# Patient Record
Sex: FEMALE | Race: WHITE
[De-identification: names, ages, dates, MRNs, and addresses within clinical notes are randomized per-mention and may not be internally consistent; named-entity substitution may affect disease eponyms.]

---

## 2020-03-19 ENCOUNTER — HOSPITAL ENCOUNTER (EMERGENCY)
Dept: HOSPITAL 84 - D.ER | Age: 40
Discharge: HOME | End: 2020-03-19
Payer: COMMERCIAL

## 2020-03-19 VITALS — DIASTOLIC BLOOD PRESSURE: 65 MMHG | SYSTOLIC BLOOD PRESSURE: 144 MMHG

## 2020-03-19 VITALS — HEIGHT: 66 IN | BODY MASS INDEX: 33.66 KG/M2 | WEIGHT: 209.44 LBS

## 2020-03-19 DIAGNOSIS — R10.9: ICD-10-CM

## 2020-03-19 DIAGNOSIS — Z3A.18: ICD-10-CM

## 2020-03-19 DIAGNOSIS — O26.892: Primary | ICD-10-CM

## 2020-03-19 DIAGNOSIS — I10: ICD-10-CM

## 2020-03-19 DIAGNOSIS — K59.00: ICD-10-CM

## 2020-03-19 LAB
ALBUMIN SERPL-MCNC: 3 G/DL (ref 3.4–5)
ALP SERPL-CCNC: 94 U/L (ref 30–120)
ALT SERPL-CCNC: 14 U/L (ref 10–68)
AMYLASE SERPL-CCNC: 295 U/L (ref 25–115)
ANION GAP SERPL CALC-SCNC: 9.3 MMOL/L (ref 8–16)
BASOPHILS NFR BLD AUTO: 0.1 % (ref 0–2)
BILIRUB SERPL-MCNC: 0.18 MG/DL (ref 0.2–1.3)
BILIRUB SERPL-MCNC: NEGATIVE MG/DL
BUN SERPL-MCNC: 5 MG/DL (ref 7–18)
CALCIUM SERPL-MCNC: 8.3 MG/DL (ref 8.5–10.1)
CHLORIDE SERPL-SCNC: 99 MMOL/L (ref 98–107)
CO2 SERPL-SCNC: 27.2 MMOL/L (ref 21–32)
CREAT SERPL-MCNC: 0.6 MG/DL (ref 0.6–1.3)
EOSINOPHIL NFR BLD: 0.9 % (ref 0–7)
ERYTHROCYTE [DISTWIDTH] IN BLOOD BY AUTOMATED COUNT: 13.8 % (ref 11.5–14.5)
GLOBULIN SER-MCNC: 4.2 G/L
GLUCOSE SERPL-MCNC: 105 MG/DL (ref 74–106)
GLUCOSE SERPL-MCNC: NEGATIVE MG/DL
HCG SERPL-ACNC: (no result) MIU/ML
HCG SERPL-ACNC: POSITIVE M[IU]/ML
HCT VFR BLD CALC: 40.9 % (ref 36–48)
HGB BLD-MCNC: 14.2 G/DL (ref 12–16)
IMM GRANULOCYTES NFR BLD: 0.3 % (ref 0–5)
KETONES UR STRIP-MCNC: (no result) MG/DL
LIPASE SERPL-CCNC: 1077 U/L (ref 73–393)
LYMPHOCYTES NFR BLD AUTO: 13.9 % (ref 15–50)
MCH RBC QN AUTO: 29.4 PG (ref 26–34)
MCHC RBC AUTO-ENTMCNC: 34.7 G/DL (ref 31–37)
MCV RBC: 84.7 FL (ref 80–100)
MONOCYTES NFR BLD: 4.1 % (ref 2–11)
NEUTROPHILS NFR BLD AUTO: 80.7 % (ref 40–80)
NITRITE UR-MCNC: NEGATIVE MG/ML
OSMOLALITY SERPL CALC.SUM OF ELEC: 261 MOSM/KG (ref 275–300)
PH UR STRIP: 5 [PH] (ref 5–6)
PLATELET # BLD: 226 10X3/UL (ref 130–400)
PMV BLD AUTO: 10.1 FL (ref 7.4–10.4)
POTASSIUM SERPL-SCNC: 3.5 MMOL/L (ref 3.5–5.1)
PROT SERPL-MCNC: 7.2 G/DL (ref 6.4–8.2)
RBC # BLD AUTO: 4.83 10X6/UL (ref 4–5.4)
SODIUM SERPL-SCNC: 132 MMOL/L (ref 136–145)
SP GR UR STRIP: 1.01 (ref 1–1.02)
UROBILINOGEN UR-MCNC: NORMAL MG/DL
WBC # BLD AUTO: 18.7 10X3/UL (ref 4.8–10.8)

## 2020-04-02 ENCOUNTER — HOSPITAL ENCOUNTER (OUTPATIENT)
Dept: HOSPITAL 84 - D.LDO | Age: 40
End: 2020-04-02
Attending: OBSTETRICS & GYNECOLOGY
Payer: COMMERCIAL

## 2020-04-02 VITALS — BODY MASS INDEX: 33.8 KG/M2

## 2020-04-02 DIAGNOSIS — O26.899: Primary | ICD-10-CM

## 2020-04-02 DIAGNOSIS — R10.9: ICD-10-CM

## 2020-04-02 DIAGNOSIS — R11.2: ICD-10-CM

## 2020-04-02 DIAGNOSIS — Z3A.00: ICD-10-CM

## 2020-04-02 LAB
ALBUMIN SERPL-MCNC: 3 G/DL (ref 3.4–5)
ALP SERPL-CCNC: 119 U/L (ref 30–120)
ALT SERPL-CCNC: 16 U/L (ref 10–68)
AMYLASE SERPL-CCNC: 429 U/L (ref 25–115)
ANION GAP SERPL CALC-SCNC: 13.2 MMOL/L (ref 8–16)
BACTERIA #/AREA URNS HPF: (no result) /HPF
BILIRUB SERPL-MCNC: 0.43 MG/DL (ref 0.2–1.3)
BILIRUB SERPL-MCNC: NEGATIVE MG/DL
BUN SERPL-MCNC: 4 MG/DL (ref 7–18)
CALCIUM SERPL-MCNC: 8 MG/DL (ref 8.5–10.1)
CHLORIDE SERPL-SCNC: 99 MMOL/L (ref 98–107)
CO2 SERPL-SCNC: 25.3 MMOL/L (ref 21–32)
CREAT SERPL-MCNC: 0.7 MG/DL (ref 0.6–1.3)
EOSINOPHIL NFR BLD: 5 % (ref 0–7)
ERYTHROCYTE [DISTWIDTH] IN BLOOD BY AUTOMATED COUNT: 13.6 % (ref 11.5–14.5)
GLOBULIN SER-MCNC: 3.6 G/L
GLUCOSE SERPL-MCNC: 100 MG/DL
GLUCOSE SERPL-MCNC: 104 MG/DL (ref 74–106)
HCT VFR BLD CALC: 40.7 % (ref 36–48)
HGB BLD-MCNC: 13.8 G/DL (ref 12–16)
KETONES UR STRIP-MCNC: NEGATIVE MG/DL
LIPASE SERPL-CCNC: 1208 U/L (ref 73–393)
LYMPHOCYTES NFR BLD AUTO: 10 % (ref 15–50)
MCH RBC QN AUTO: 29.1 PG (ref 26–34)
MCHC RBC AUTO-ENTMCNC: 33.9 G/DL (ref 31–37)
MCV RBC: 85.9 FL (ref 80–100)
MONOCYTES NFR BLD: 4 % (ref 2–11)
NEUTROPHILS NFR BLD AUTO: 78 % (ref 40–80)
NITRITE UR-MCNC: NEGATIVE MG/ML
OSMOLALITY SERPL CALC.SUM OF ELEC: 264 MOSM/KG (ref 275–300)
PH UR STRIP: 5 [PH] (ref 5–6)
PLATELET # BLD EST: NORMAL 10*3/UL
PLATELET # BLD: 247 10X3/UL (ref 130–400)
PMV BLD AUTO: 10.6 FL (ref 7.4–10.4)
POTASSIUM SERPL-SCNC: 3.5 MMOL/L (ref 3.5–5.1)
PROT SERPL-MCNC: 6.6 G/DL (ref 6.4–8.2)
RBC # BLD AUTO: 4.74 10X6/UL (ref 4–5.4)
RBC #/AREA URNS HPF: (no result) /HPF (ref 0–5)
SODIUM SERPL-SCNC: 134 MMOL/L (ref 136–145)
SP GR UR STRIP: 1.02 (ref 1–1.02)
SQUAMOUS #/AREA URNS HPF: (no result) /HPF (ref 0–5)
UROBILINOGEN UR-MCNC: NORMAL MG/DL
WBC # BLD AUTO: 21.3 10X3/UL (ref 4.8–10.8)
WBC #/AREA URNS HPF: (no result) /HPF

## 2020-05-21 ENCOUNTER — HOSPITAL ENCOUNTER (OUTPATIENT)
Dept: HOSPITAL 84 - D.LDO | Age: 40
Discharge: HOME | End: 2020-05-21
Attending: OBSTETRICS & GYNECOLOGY
Payer: COMMERCIAL

## 2020-05-21 VITALS — BODY MASS INDEX: 33.8 KG/M2

## 2020-05-21 DIAGNOSIS — Z3A.27: ICD-10-CM

## 2020-05-21 DIAGNOSIS — O16.2: Primary | ICD-10-CM

## 2020-05-21 LAB
ALBUMIN SERPL-MCNC: 2.6 G/DL (ref 3.4–5)
ALP SERPL-CCNC: 114 U/L (ref 30–120)
ALT SERPL-CCNC: 12 U/L (ref 10–68)
ANION GAP SERPL CALC-SCNC: 11.3 MMOL/L (ref 8–16)
BASOPHILS NFR BLD AUTO: 0.2 % (ref 0–2)
BILIRUB DIRECT SERPL-MCNC: 0.1 MG/DL (ref 0–0.3)
BILIRUB INDIRECT SERPL-MCNC: 0.1 MG/DL (ref 0–1)
BILIRUB SERPL-MCNC: 0.2 MG/DL (ref 0.2–1.3)
BILIRUB SERPL-MCNC: NEGATIVE MG/DL
BUN SERPL-MCNC: 5 MG/DL (ref 7–18)
CALCIUM SERPL-MCNC: 7.9 MG/DL (ref 8.5–10.1)
CHLORIDE SERPL-SCNC: 101 MMOL/L (ref 98–107)
CO2 SERPL-SCNC: 21.8 MMOL/L (ref 21–32)
CREAT SERPL-MCNC: 0.8 MG/DL (ref 0.6–1.3)
EOSINOPHIL NFR BLD: 1.9 % (ref 0–7)
ERYTHROCYTE [DISTWIDTH] IN BLOOD BY AUTOMATED COUNT: 13.8 % (ref 11.5–14.5)
GLOBULIN SER-MCNC: 3.7 G/L
GLUCOSE SERPL-MCNC: 166 MG/DL (ref 74–106)
GLUCOSE SERPL-MCNC: NEGATIVE MG/DL
HCT VFR BLD CALC: 38.1 % (ref 36–48)
HGB BLD-MCNC: 12.8 G/DL (ref 12–16)
IMM GRANULOCYTES NFR BLD: 0.6 % (ref 0–5)
KETONES UR STRIP-MCNC: NEGATIVE MG/DL
LYMPHOCYTES NFR BLD AUTO: 11.2 % (ref 15–50)
MCH RBC QN AUTO: 29.4 PG (ref 26–34)
MCHC RBC AUTO-ENTMCNC: 33.6 G/DL (ref 31–37)
MCV RBC: 87.4 FL (ref 80–100)
MONOCYTES NFR BLD: 3.6 % (ref 2–11)
NEUTROPHILS NFR BLD AUTO: 82.5 % (ref 40–80)
NITRITE UR-MCNC: NEGATIVE MG/ML
OSMOLALITY SERPL CALC.SUM OF ELEC: 263 MOSM/KG (ref 275–300)
PH UR STRIP: 8 [PH] (ref 5–6)
PLATELET # BLD: 213 10X3/UL (ref 130–400)
PMV BLD AUTO: 10.2 FL (ref 7.4–10.4)
POTASSIUM SERPL-SCNC: 3.1 MMOL/L (ref 3.5–5.1)
PROT SERPL-MCNC: 6.3 G/DL (ref 6.4–8.2)
RBC # BLD AUTO: 4.36 10X6/UL (ref 4–5.4)
SODIUM SERPL-SCNC: 131 MMOL/L (ref 136–145)
SP GR UR STRIP: 1 (ref 1–1.02)
URATE SERPL-MCNC: 3.3 MG/DL (ref 2.6–7.2)
UROBILINOGEN UR-MCNC: NORMAL MG/DL
WBC # BLD AUTO: 19.2 10X3/UL (ref 4.8–10.8)

## 2020-05-22 LAB
PROT 24H UR-MRATE: 195 MG/24HR (ref 0–149.1)
PROT UR-MCNC: 9.5 MG/DL (ref 0–11.9)

## 2020-07-23 ENCOUNTER — HOSPITAL ENCOUNTER (INPATIENT)
Dept: HOSPITAL 84 - D.LDO | Age: 40
LOS: 1 days | Discharge: HOME | End: 2020-07-24
Attending: OBSTETRICS & GYNECOLOGY | Admitting: OBSTETRICS & GYNECOLOGY
Payer: COMMERCIAL

## 2020-07-23 VITALS — HEIGHT: 66 IN | WEIGHT: 209.44 LBS | BODY MASS INDEX: 33.66 KG/M2

## 2020-07-23 VITALS — DIASTOLIC BLOOD PRESSURE: 59 MMHG | SYSTOLIC BLOOD PRESSURE: 111 MMHG

## 2020-07-23 DIAGNOSIS — O36.4XX0: Primary | ICD-10-CM

## 2020-07-23 DIAGNOSIS — Z3A.36: ICD-10-CM

## 2020-07-23 DIAGNOSIS — Z37.1: ICD-10-CM

## 2020-07-23 DIAGNOSIS — O45.93: ICD-10-CM

## 2020-07-23 LAB
ALBUMIN SERPL-MCNC: 2.4 G/DL (ref 3.4–5)
ALP SERPL-CCNC: 175 U/L (ref 30–120)
ALT SERPL-CCNC: 12 U/L (ref 10–68)
AMPHETAMINES UR QL SCN: NEGATIVE QUAL
ANION GAP SERPL CALC-SCNC: 14 MMOL/L (ref 8–16)
BARBITURATES UR QL SCN: NEGATIVE QUAL
BENZODIAZ UR QL SCN: NEGATIVE QUAL
BILIRUB SERPL-MCNC: 0.28 MG/DL (ref 0.2–1.3)
BUN SERPL-MCNC: 8 MG/DL (ref 7–18)
BZE UR QL SCN: NEGATIVE QUAL
CALCIUM SERPL-MCNC: 8.6 MG/DL (ref 8.5–10.1)
CANNABINOIDS UR QL SCN: NEGATIVE QUAL
CHLORIDE SERPL-SCNC: 101 MMOL/L (ref 98–107)
CO2 SERPL-SCNC: 20.7 MMOL/L (ref 21–32)
CREAT SERPL-MCNC: 1.1 MG/DL (ref 0.6–1.3)
EOSINOPHIL NFR BLD: 3 % (ref 0–7)
ERYTHROCYTE [DISTWIDTH] IN BLOOD BY AUTOMATED COUNT: 13.9 % (ref 11.5–14.5)
GLOBULIN SER-MCNC: 4.1 G/L
GLUCOSE SERPL-MCNC: 155 MG/DL (ref 74–106)
HCT VFR BLD CALC: 38.6 % (ref 36–48)
HGB BLD-MCNC: 13.3 G/DL (ref 12–16)
LYMPHOCYTES NFR BLD AUTO: 14 % (ref 15–50)
MCH RBC QN AUTO: 29.8 PG (ref 26–34)
MCHC RBC AUTO-ENTMCNC: 34.5 G/DL (ref 31–37)
MCV RBC: 86.5 FL (ref 80–100)
MONOCYTES NFR BLD: 6 % (ref 2–11)
NEUTROPHILS NFR BLD AUTO: 71 % (ref 40–80)
OPIATES UR QL SCN: NEGATIVE QUAL
OSMOLALITY SERPL CALC.SUM OF ELEC: 265 MOSM/KG (ref 275–300)
PCP UR QL SCN: NEGATIVE QUAL
PLATELET # BLD EST: NORMAL 10*3/UL
PLATELET # BLD: 161 10X3/UL (ref 130–400)
PMV BLD AUTO: 10.4 FL (ref 7.4–10.4)
POTASSIUM SERPL-SCNC: 3.7 MMOL/L (ref 3.5–5.1)
PROT SERPL-MCNC: 6.5 G/DL (ref 6.4–8.2)
RBC # BLD AUTO: 4.46 10X6/UL (ref 4–5.4)
SODIUM SERPL-SCNC: 132 MMOL/L (ref 136–145)
WBC # BLD AUTO: 27.3 10X3/UL (ref 4.8–10.8)

## 2020-07-23 PROCEDURE — 3E033VJ INTRODUCTION OF OTHER HORMONE INTO PERIPHERAL VEIN, PERCUTANEOUS APPROACH: ICD-10-PCS | Performed by: OBSTETRICS & GYNECOLOGY

## 2020-07-23 NOTE — NUR
NOTIFIED OF PT REQUEST FOR PAIN MED AND D/W MD CTX PATTERN EVERY 1
MIN. NEW ORDERS NOTED TO DC CYTOTEC

## 2020-07-24 VITALS — SYSTOLIC BLOOD PRESSURE: 121 MMHG | DIASTOLIC BLOOD PRESSURE: 64 MMHG

## 2020-07-24 VITALS — DIASTOLIC BLOOD PRESSURE: 66 MMHG | SYSTOLIC BLOOD PRESSURE: 135 MMHG

## 2020-07-24 LAB
ALBUMIN SERPL-MCNC: 2.1 G/DL (ref 3.4–5)
ALP SERPL-CCNC: 141 U/L (ref 30–120)
ALT SERPL-CCNC: 10 U/L (ref 10–68)
AMYLASE SERPL-CCNC: 94 U/L (ref 25–115)
ANION GAP SERPL CALC-SCNC: 13.1 MMOL/L (ref 8–16)
APTT BLD: 32 SECONDS (ref 22.8–39.4)
BACTERIA #/AREA URNS HPF: (no result) /HPF
BASOPHILS NFR BLD AUTO: 0.1 % (ref 0–2)
BILIRUB SERPL-MCNC: 0.2 MG/DL (ref 0.2–1.3)
BILIRUB SERPL-MCNC: NEGATIVE MG/DL
BUN SERPL-MCNC: 10 MG/DL (ref 7–18)
CALCIUM SERPL-MCNC: 8.2 MG/DL (ref 8.5–10.1)
CAOX CRY #/AREA URNS HPF: (no result) /HPF
CHLORIDE SERPL-SCNC: 101 MMOL/L (ref 98–107)
CO2 SERPL-SCNC: 24.6 MMOL/L (ref 21–32)
CREAT SERPL-MCNC: 1.2 MG/DL (ref 0.6–1.3)
D DIMER PPP FEU-MCNC: > 20 UG/MLFEU (ref 0.2–0.54)
EOSINOPHIL NFR BLD: 0.1 % (ref 0–7)
ERYTHROCYTE [DISTWIDTH] IN BLOOD BY AUTOMATED COUNT: 14 % (ref 11.5–14.5)
FIBRINOGEN PPP-MCNC: 153 MG/DL (ref 239–481)
GLOBULIN SER-MCNC: 3.3 G/L
GLUCOSE SERPL-MCNC: 100 MG/DL
GLUCOSE SERPL-MCNC: 140 MG/DL (ref 74–106)
HCT VFR BLD CALC: 29.6 % (ref 36–48)
HGB BLD-MCNC: 10 G/DL (ref 12–16)
IMM GRANULOCYTES NFR BLD: 2.3 % (ref 0–5)
INR PPP: 1.24 (ref 0.85–1.17)
KETONES UR STRIP-MCNC: NEGATIVE MG/DL
LIPASE SERPL-CCNC: 164 U/L (ref 73–393)
LYMPHOCYTES NFR BLD AUTO: 5.8 % (ref 15–50)
MCH RBC QN AUTO: 29.2 PG (ref 26–34)
MCHC RBC AUTO-ENTMCNC: 33.8 G/DL (ref 31–37)
MCV RBC: 86.5 FL (ref 80–100)
MONOCYTES NFR BLD: 3.5 % (ref 2–11)
NEUTROPHILS NFR BLD AUTO: 88.2 % (ref 40–80)
NITRITE UR-MCNC: NEGATIVE MG/ML
OSMOLALITY SERPL CALC.SUM OF ELEC: 268 MOSM/KG (ref 275–300)
PH UR STRIP: 5 [PH] (ref 5–6)
PLATELET # BLD: 117 10X3/UL (ref 130–400)
PMV BLD AUTO: 10.4 FL (ref 7.4–10.4)
POTASSIUM SERPL-SCNC: 4.7 MMOL/L (ref 3.5–5.1)
PROT SERPL-MCNC: 5.4 G/DL (ref 6.4–8.2)
PROTHROMBIN TIME: 15.5 SECONDS (ref 11.6–15)
RBC # BLD AUTO: 3.42 10X6/UL (ref 4–5.4)
RBC #/AREA URNS HPF: (no result) /HPF (ref 0–5)
SODIUM SERPL-SCNC: 134 MMOL/L (ref 136–145)
SP GR UR STRIP: 1.03 (ref 1–1.02)
SQUAMOUS #/AREA URNS HPF: (no result) /HPF (ref 0–5)
UROBILINOGEN UR-MCNC: NORMAL MG/DL
WBC # BLD AUTO: 36.7 10X3/UL (ref 4.8–10.8)
WBC #/AREA URNS HPF: (no result) /HPF

## 2020-07-24 NOTE — NUR
WRITTEN AND VERBAL DISCHARGE INSTRUCTIONS GIVEN. VERBALIZES UNDERSTANDING. PT
PROVIDED WITH RESOLVE THROUGH SHARING PACKET, PFW PP CARE INSTRUCTIONS
HANDOUT, SAVE YOUR LIFE HANDOUT, AND COMMUNITY [employer] HANDOUT PROVIDED. KEEP
SAKE BOX ALSO PROVIDED WITH HAND AND FOOT PRINTS. DENIES QUESTIONS. PT
REQUESTS TO EAT DINNER TRAY AND THEN LEAVE. STATES THAT SHE WILL CALL WITH
CALL LIGHT WHEN READY TO LEAVE.

## 2020-07-24 NOTE — NUR
DR. RICKETTS CALLS UNIT, UPDATE ON PT STATUS GIVEN. PER DR. RICKETTS HE WILL
ROUND ON PT FOLLOWING COMPLETION OF CLINIC.

## 2020-07-24 NOTE — NUR
BACK TO ROOM. PT CALLS RN TO ROOM, REQUESTS THAT [facility] BE
CONTACTED FOR REMOVAL OF INFANT. DENIES PAIN AND NEEDS AT THIS TIME. REFUSES
V/S AT THIS TIME, STATES "I FEEL FINE AND NEED TO MAKE A FEW PHONE CALLS."
PT'S MOTHER REMAINS WITH HER, SUPPORTIVE AND ATTENTIVE TO PT AND PT NEEDS.

## 2020-07-24 NOTE — NUR
SITTING IN HIGH FOWLERS POSITION. BECOMES TEARFUL. QUESTIONS REGARDING ORGAN
DONATION ANSWERED AND REINFORCED WITH PT THAT KEVIN  WOULD HAVE TO
BE CONTACTED TO ANSWER MOST QUESTIONS REGARDING LENGTH OF TIME THAT TISSUE IS
HARVESTABLE, VERBALIZES UNDERSTANDING. CHAPLE SERVICES OFFERED AND DECLINED AT
THIS TIME. STATES THAT SHE HAS ACCEPTED INFANT LOSS BUT IS CONCERNED FOR
SIGNIFICANT OTHER. PT VERBALIZES THAT SHE FEELS HIS PARENTS MAY HAVE TO COME
TO HELP SUPPORT HIM AND TO "GET HIM TO REALITY OF WHAT HAS HAPPENED." STATES
THAT SHE KNOWS VISITOR RESTRICTIONS ARE IN PLACE, CONTACTED CASSY, HOUSE
SUPERVISOR AND DISCUSSED SITUATION. STATES THAT EXPECTION CAN BE MADE AND SHE
WOULD NOTIFY BOTH ENTRANCES. PT NOTIFIED AND VERBALIZES APPRECIATION.

## 2020-07-24 NOTE — NUR
REQUESTS TO SHOWER FOLLOWING EATING LUNCH. STATES THAT HER MOTHER WILL REMAIN
IN ROOM AND AT BEDSIDE WITH HER. REQUESTS TO TAKE NORCO FOLLOWING SHOWER.
DENIES NEEDS AT THIS TIME. WILL CONTINUE TO MONITOR. PT ALSO REQUESTING TO D/C
HOME ASAP, WILL DISCUSS WITH DR. RICKETTS WHEN HE ROUNDS.

## 2020-07-24 NOTE — NUR
SPOKE WITH [facility] REP. NOTIFIED OF NEED FOR
REMOVAL OF INFANT. WILL SEND STAFF FOR REMOVAL.

## 2020-07-24 NOTE — NUR
HERBERTH  CALLS UNIT IN REGARDS TO INFANT TO DISCUSS IF INFANT HAD BEEN
PLACED IN COOLER. ERICK,  INFORMED THAT FOB HAD NOT GIVEN
PERMISSION FOR INFANT TO BE TAKE FROM ROOM. PER ERICK, INFANT IS BEING
RELEASED AND IS UNABLE TO BE USED FOR DONATION. WILL NOTIFY PT.

## 2020-07-24 NOTE — NUR
UP TO VOID. DENIES DIZZINESS AND LIGHTHEADEDNESS. VOIDED 700 MLS WITH CLOT THE
SIZE OF HARD BOILED EGG PASSED. BACK TO BED, CONTINUES TO DENY
DIZZINESS/LIGHTHEADEDNESS. FUNDUS FIRM, MIDLINE AND U1 WITH SMALL RUBRA
LOCHIA, NO CLOTS NOTED TO PERIPADS. SIGNFICANT OTHER NOT IN ROOM AT THIS TIME,
STATES THAT SHE ASK HIM TO LEAVE TO CARE FOR OLDER CHILD AT HOME. QUESTIONS
REGARDING VISITOR AND VISITATION ANSWERED, PT VERBALIZES UNDERSTANDING STATES
THAT HER MOTHER IS GOING TO COME HELP HER MAKE DECISIONS. PT ALSO INFORMED
THAT [facility] HAD RELEASED INFANT AND INFANT WAS NO LONGER A CANDIDATE FOR
ORGAN/TISSUE DONATION D/T NOT BEING COOLED, VERBALIZES UNDERSTANDING AND
DENIES WANTING TO SPEAK WITH [facility] . INFANT REMAINS IN ROOM WITH
PT D/T PT REQUEST FOR BONDING TIME, STATES "I'VE ONLY GOTTEN TO HOLD HIM FOR A
FEW MINUTES BECAUSE I WAS TRYING TO LET ROSIE HAVE HIS TIME WITH HIM." LIGHTS
OFF PER PT REQUEST. DENIES NEEDS. BED IN LOW POSITION WITH SRUP X2. CALL LIGHT
AND PHONE WITHIN REACH. WILL CONTINUE TO MONITOR.

## 2020-07-24 NOTE — NUR
AMBULATORY IN [place]. PT REQUESTED RN CALL [facility] AND [place] 
TO FIND OUT COST OF CREMATION AND BURIAL AND IF CREMENTATORY IS PRESENT ON
SITE. BOTH  HOMES CONTACTED IN PT ROOM AND QUESTIONS AND ANSWERS
REVIEWED WITH PT. VERBALIZES UNDERSTANDING. REQUESTS TO DISCUSS WITH MOTHER
AND NOTIFY RN OF DECISION AT LATER TIME.

## 2020-07-24 NOTE — NUR
PT'S MOTHER ON UNIT AND TAKEN TO ROOM. PT JUST PICKED INFANT UP FROM OPEN
CRIB. REQUEST TIME TO BOND WITH INFANT WITH HER MOTHER IN ROOM PRIOR TO RN
TAKING V/S. DENIES PAIN AND NEEDS. BED IN LOW POSITION WITH SRUP X2. CALL
LIGHT AND PHONE WITHIN REACH. WILL CONTINUE TO MONITOR.

## 2020-07-24 NOTE — NUR
SHIFT ASSESSMENT COMPLETED PER FLOWSHEET. VSS. FUNDUS FIRM, MIDLINE AND U1
WITH MODERATE AMT RUBRA LOCHIA, NO CLOTS NOTED. PERICARE DONE. PADS AND CHUX
CHANGED. PT ABLE TO GARAY. DENIES PAIN AND NEEDS. SIGNIFICANT OTHER IN ROOM
LOOKING AND INFANT IN OPEN CRIB UNDER LIGHT. SIGNIFICANT OTHER ASKS STAFF TO
LOOK AT INFANT'S COLOR AND CHECK FOR HEARTBEAT, STATES HE'S STILL WARM,
EXPLAINED TO THAT WARMTH IS FROM LIGHT AND HEARTBEAT THAT HE IS FEELING IS
LIKELY HIS OWN PULSE. STATES THAT HE HAS SEEN INFANT OPEN EYES AND MOVE. PT
REASSURES SIGNIFICANT OTHER THAT INFANT HAS PASSED AND IS NOT LIVING.
DISCUSSED MOVING TO NEW ROOM WITH PT, VERBALOZES UNDERSTANDING AND AGREEMENT.
DISCUSSED WITH BOTH TAKING INFANT TO COOLER, FOB REFUSES. STATES THAT HE IS
GOING TO KEEP INFANT WITH HIM. PT AMBULATORY TO ROOM 1276 FOR CONTINUED PP
CARE. ORIENTED TO ROOM, CALL LIGHT USE AND BR, VERBALIZES UNDERSTANDING. BED
IN LOW POSITION WITH SRUP X2. CALL LIGHT AND PHONE WITHIN REACH. WILL CONTINUE
TO MONITOR.

## 2020-07-24 NOTE — NUR
UP TO VOID. VOIDED 700 MLS IN HAT, NO CLOTS NOTED. PERICARE DONE PER PT. VSS.
FUNDUS FIRM, MIDLINE AND U1 WITH SMALL RUBRA LOCHIA, NO CLOTS NOTED. PT
REQUEST PAIN MEDICATION AT THIS TIME D/T PAIN WITH FUNDAL CHECK. STEADY GAIT
NOTED. DENIES DIZZINESS AND LIGHTHEADEDNESS. PT'S MOTHER AT BEDSIDE,
SUPPORTIVE AND ATTENTIVE. PT REQUEST LIST OF  HOME PHONE NUMBERS TO
CALL TO ASK QUESTIONS. STATES THAT SIGNIFICANT OTHER REMAINS AT HOME CARING
FOR TODDLER AND SHE WOULD LIKE TO GET ARRANGEMENTS MADE ASAP. INFANT TO COOLER
PER MOM REQUEST. STATES THAT SHE WOULD LIKE TO SEE INFANT AND WILL NOTIFY RN
WHEN READY TO VIEW INFANT AGAIN.

## 2020-07-24 NOTE — NUR
C/O ABD CRAMPING 4-5/10. REQUESTS NORCO AND GIVEN PER REQUEST. DENIES
ADDITIONAL NEEDS. LT AND RT A/C PIV, AND L FA PIV'S REMOVED, ALL WITH TIPS
INTACT AND BANDAID APPLIED. DENIES ADDITIONAL NEEDS.

## 2020-07-24 NOTE — NUR
BACK TO ROOM. LIST OF  HOMES PROVIDED TO PT AND HER MOTHER. STATES THAT
SIGNIFICANT OTHER WILL BE COMING BACK TO UNIT TO DISCUSS OPTIONS AND ASSIST
WITH MAKING ARRANGEMENTS.

## 2020-08-08 ENCOUNTER — HOSPITAL ENCOUNTER (OUTPATIENT)
Dept: HOSPITAL 84 - D.LDO | Age: 40
Discharge: HOME | End: 2020-08-08
Attending: OBSTETRICS & GYNECOLOGY
Payer: COMMERCIAL

## 2020-08-08 VITALS — BODY MASS INDEX: 33.8 KG/M2

## 2020-08-08 DIAGNOSIS — I10: Primary | ICD-10-CM

## 2020-08-08 LAB — PROT UR-MCNC: 141.8 MG/DL (ref 0–11.9)

## 2020-08-08 NOTE — NUR
TELEPHONE CALL MADE TO DR. RICKETTS, INQUIRING ABOUT URINE THAT WAS DROPPED
OFF IN THE LAB BY THE PT.  DR. RICKETTS WANTS A 24 HOUR URNE TOTAL PROTEIN
ORDERED.  ANNALEE IN THE LAB NOTIFIED, AND SHE DOES HAVE THE SPECIMEN.